# Patient Record
(demographics unavailable — no encounter records)

---

## 2019-01-11 NOTE — REP
Right upper quadrant sonography:

 

History: Elevated liver function studies.

 

Comparison study: No comparison study.

 

Findings: Scanning through the right upper quadrant of the abdomen demonstrates a

normal sized, thin-walled gallbladder without evidence of stone or polyp.  Common

bile duct is normal measuring 0.5 cm in greatest diameter.  No focal liver lesion

is seen.  Liver size is normal.  No pancreatic abnormality is observed.  No right

renal abnormality is seen.  There is no evidence of ascites.  The right kidney

measures and 10 degrees 7 x 5.4 x 4.1 cm.

 

Impression:

 

Negative right upper quadrant sonography.

 

 

Electronically Signed by

Dre Kuhn MD 01/11/2019 09:24 A

## 2019-10-17 NOTE — REP
Three views lumbar spine:  10/17/2019.

 

Indication:  Low back pain.

 

Comparison:  None.

 

Findings:

 

Very minimal retrolisthesis of L5 on S1 is present.  Vertebral body alignment is

otherwise unremarkable.  There is no acute fracture, subluxation or dislocation.

No destructive osseous lesions are present.

 

Impression:

 

No acute osseous injury of the lumbar spine.  Essentially unremarkable.

 

 

Electronically Signed by

Baltazar Wei DO 10/17/2019 04:25 P